# Patient Record
Sex: FEMALE | Race: WHITE | URBAN - METROPOLITAN AREA
[De-identification: names, ages, dates, MRNs, and addresses within clinical notes are randomized per-mention and may not be internally consistent; named-entity substitution may affect disease eponyms.]

---

## 2017-02-27 ENCOUNTER — TRANSFERRED RECORDS (OUTPATIENT)
Dept: HEALTH INFORMATION MANAGEMENT | Facility: CLINIC | Age: 22
End: 2017-02-27

## 2017-03-29 ENCOUNTER — OFFICE VISIT (OUTPATIENT)
Dept: ORTHOPEDICS | Facility: CLINIC | Age: 22
End: 2017-03-29

## 2017-03-29 DIAGNOSIS — M25.519 ARTHRALGIA OF SHOULDER, UNSPECIFIED LATERALITY: Primary | ICD-10-CM

## 2017-03-29 NOTE — LETTER
"  3/29/2017      RE: Ronel Saenz  [address] [facility] [address]       CHIEF COMPLAINT:  Right shoulder pain and instability.      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  Her symptoms started in November or early December.  She had a questionable subluxation.  She now feels that her shoulder is unstable overhead.  She has pain primarily anteriorly.  It is worse with activity.  She does feel that her shoulder \"slips\".  She has no neck pain.  Denies numbness or tingling.  She has been doing some rehabilitation with her athletic training staff.  She has had an MRI at the [facility].      PHYSICAL EXAMINATION:  21-year-old female, alert and oriented, in no apparent distress.  Cervical spine nontender, full range of motion.  Negative Spurling's.  Evaluation of the shoulder girdle reveals no muscular atrophy.  She has full and symmetrical range of motion.  Nontender at the AC joint, slightly tender in the bicipital groove.  Nontender laterally.  She has a positive [place]'s.  Negative Neer, negative [place].  Negative cross-arm.  No Speed's.  She has pain and slight apprehension in the apprehension position.  This is reduced with relocation.  No posterior apprehension.  I am unable to get a significant anterior load and shift.  No posterior load and shift.      MRI:  I reviewed the patient's MRI.  She does have signal within the superior labrum consistent with a SLAP tear.  There is some mild signal in the supraspinatus consistent with tendinopathy.  She may have some thickening of her anterior and anteroinferior labrum.  Articular cartilage is intact.      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  I had a long conversation with Jayla regarding her shoulder.  She does have labral pathology.  We may be able to rehab her out of her symptoms and allow her " to continue to participate without surgery.  However, I am a bit concerned given her activity level.  We did discuss labral repair and stabilization.  I discussed the prolonged rehabilitation time for this.  At this point, I think Jayla would like to speak with her coaches and parents.  She will let me know what she decides to do.  I am happy to see Jayla back if she would like to discuss surgery.  If she would like to continue nonoperative treatment, we need to have her focus on her cuff and periscapular strengthening.      I spent 15 minutes with this patient, 10 minutes dedicated to counseling, education and development of a treatment plan.         ADDI MCQUEEN MD             D: 2017 14:30   T: 2017 23:23   MT: CHRISTOPHER      Name:     AURELIO WHITEHEAD   MRN:      [id number]        Account:      [id number]   :      1995           Service Date: 2017      Document: [id number]

## 2017-03-29 NOTE — MR AVS SNAPSHOT
After Visit Summary   3/29/2017    Ronel Saenz    MRN: [id number]           Patient Information     Date Of Birth          1995        Visit Information        Provider Department      3/29/2017 6:30 PM Jhon Collier MD [facility]        Today's Diagnoses     Arthralgia of shoulder, unspecified laterality    -  1       Follow-ups after your visit        Who to contact     Please call your clinic at [phone] to:    Ask questions about your health    Make or cancel appointments    Discuss your medicines    Learn about your test results    Speak to your doctor   If you have compliments or concerns about an experience at your clinic, or if you wish to file a complaint, please contact [facility] Patient Relations at [phone] or email us at [email or web address]         Additional Information About Your Visit        MyChart Information     [employer] gives you secure access to your electronic health record. If you see a primary care provider, you can also send messages to your care team and make appointments. If you have questions, please call your primary care clinic.  If you do not have a primary care provider, please call [phone] and they will assist you.      [employer] is an electronic gateway that provides easy, online access to your medical records. With [employer], you can request a clinic appointment, read your test results, renew a prescription or communicate with your care team.     To access your existing account, please contact your [facility] or call [phone] for assistance.        Care EveryWhere ID     This is your Care EveryWhere ID. This could be used by other organizations to access your [facility] medical records  [id number]         Blood Pressure from Last 3 Encounters:   10/15/15 121/74   06/25/15 131/77    Weight from Last 3 Encounters:   10/15/15 185 lb (83.9 kg)    06/25/15 182 lb 3.2 oz (82.6 kg)              Today, you had the following     No orders found for display       Primary Care Provider Office Phone # Fax #    Maurice Michaels [phone]        [facility] [address] [facility] [address]        Thank you!     Thank you for choosing [facility]  for your care. Our goal is always to provide you with excellent care. Hearing back from our patients is one way we can continue to improve our services. Please take a few minutes to complete the written survey that you may receive in the mail after your visit with us. Thank you!             Your Updated Medication List - Protect others around you: Learn how to safely use, store and throw away your medicines at www.disposemymeds.org.          This list is accurate as of: 3/29/17 11:59 PM.  Always use your most recent med list.                   Brand Name Dispense Instructions for use    adapalene 0.1 % cream    DIFFERIN     Apply topically At Bedtime       ADDERALL PO

## 2017-04-03 NOTE — PROGRESS NOTES
"CHIEF COMPLAINT:  Right shoulder pain and instability.      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  Her symptoms started in November or early December.  She had a questionable subluxation.  She now feels that her shoulder is unstable overhead.  She has pain primarily anteriorly.  It is worse with activity.  She does feel that her shoulder \"slips\".  She has no neck pain.  Denies numbness or tingling.  She has been doing some rehabilitation with her athletic training staff.  She has had an MRI at the [facility].      PHYSICAL EXAMINATION:  21-year-old female, alert and oriented, in no apparent distress.  Cervical spine nontender, full range of motion.  Negative Spurling's.  Evaluation of the shoulder girdle reveals no muscular atrophy.  She has full and symmetrical range of motion.  Nontender at the AC joint, slightly tender in the bicipital groove.  Nontender laterally.  She has a positive [place]'s.  Negative Neer, negative [place].  Negative cross-arm.  No Speed's.  She has pain and slight apprehension in the apprehension position.  This is reduced with relocation.  No posterior apprehension.  I am unable to get a significant anterior load and shift.  No posterior load and shift.      MRI:  I reviewed the patient's MRI.  She does have signal within the superior labrum consistent with a SLAP tear.  There is some mild signal in the supraspinatus consistent with tendinopathy.  She may have some thickening of her anterior and anteroinferior labrum.  Articular cartilage is intact.      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  I had a long conversation with Jayla regarding her shoulder.  She does have labral pathology.  We may be able to rehab her out of her symptoms and allow her to continue to participate without surgery.  However, I am a bit concerned given her " activity level.  We did discuss labral repair and stabilization.  I discussed the prolonged rehabilitation time for this.  At this point, I think Jayla would like to speak with her coaches and parents.  She will let me know what she decides to do.  I am happy to see Jayla back if she would like to discuss surgery.  If she would like to continue nonoperative treatment, we need to have her focus on her cuff and periscapular strengthening.      I spent 15 minutes with this patient, 10 minutes dedicated to counseling, education and development of a treatment plan.         ADDI MCQUEEN MD             D: 2017 14:30   T: 2017 23:23   MT: CHRISTOPHER      Name:     AURELIO WHITEHEAD   MRN:      [id number]        Account:      [id number]   :      1995           Service Date: 2017      Document: [id number]

## 2017-12-24 ENCOUNTER — HEALTH MAINTENANCE LETTER (OUTPATIENT)
Age: 22
End: 2017-12-24

## 2020-03-10 ENCOUNTER — HEALTH MAINTENANCE LETTER (OUTPATIENT)
Age: 25
End: 2020-03-10

## 2020-12-27 ENCOUNTER — HEALTH MAINTENANCE LETTER (OUTPATIENT)
Age: 25
End: 2020-12-27

## 2021-04-24 ENCOUNTER — HEALTH MAINTENANCE LETTER (OUTPATIENT)
Age: 26
End: 2021-04-24

## 2021-10-03 ENCOUNTER — HEALTH MAINTENANCE LETTER (OUTPATIENT)
Age: 26
End: 2021-10-03

## 2022-05-15 ENCOUNTER — HEALTH MAINTENANCE LETTER (OUTPATIENT)
Age: 27
End: 2022-05-15

## 2022-09-11 ENCOUNTER — HEALTH MAINTENANCE LETTER (OUTPATIENT)
Age: 27
End: 2022-09-11

## 2023-06-03 ENCOUNTER — HEALTH MAINTENANCE LETTER (OUTPATIENT)
Age: 28
End: 2023-06-03